# Patient Record
Sex: FEMALE
[De-identification: names, ages, dates, MRNs, and addresses within clinical notes are randomized per-mention and may not be internally consistent; named-entity substitution may affect disease eponyms.]

---

## 2018-07-30 ENCOUNTER — HEALTH MAINTENANCE LETTER (OUTPATIENT)
Age: 53
End: 2018-07-30

## 2018-08-01 NOTE — TELEPHONE ENCOUNTER
FUTURE VISIT INFORMATION      FUTURE VISIT INFORMATION:    Date: 8/13/18    Time: 1330    Location: ORTHO  REFERRAL INFORMATION:    Referring provider:  GAB LUU    Referring providers clinic:  [facility]    Reason for visit/diagnosis  R HIP    RECORDS REQUESTED FROM:       Clinic name Comments Records Status Imaging Status   [facility] FAXED REQUEST FOR IMAGES AND RECORDS 8/1/18@1200                                     RECORDS STATUS      RECORDS RECEIVED FROM: [facility]   DATE RECEIVED: 8/6/18   NOTES STATUS DETAILS   OFFICE NOTE from referring provider Received 1/2/18*2/14/18*6/20/18*7/10/18*   OFFICE NOTE from other specialist N/A    DISCHARGE SUMMARY from hospital N/A    DISCHARGE REPORT from the ER N/A    OPERATIVE REPORT N/A    MEDICATION LIST N/A    IMPLANT RECORD/STICKER N/A    LABS     CBC/DIFF N/A    CULTURES N/A    INJECTIONS DONE IN RADIOLOGY Received 6/25/18*   MRI N/A    CT SCAN Received 3/15/18*   XRAYS (IMAGES & REPORTS) N/A    TUMOR     PATHOLOGY  Slides & report N/A      RECORDS RECEIVED FROM: [place]   DATE RECEIVED: 8/6/18   NOTES STATUS DETAILS   OFFICE NOTE from referring provider N/A    OFFICE NOTE from other specialist Care Everywhere 5/17/18*   DISCHARGE SUMMARY from hospital N/A    DISCHARGE REPORT from the ER N/A    OPERATIVE REPORT N/A    MEDICATION LIST Care Everywhere    IMPLANT RECORD/STICKER N/A    LABS     CBC/DIFF Care Everywhere    DEXA Received  5/17/18*7/25/17*   NM BONE SCAN Received  5/17/18*   MRI received  6/7/18*   CT SCAN N/A    XRAYS (IMAGES & REPORTS) N/A    TUMOR     PATHOLOGY  Slides & report N/A

## 2018-08-06 ASSESSMENT — ENCOUNTER SYMPTOMS
BLOATING: 1
BLOOD IN STOOL: 0
NAUSEA: 1
STIFFNESS: 1
RECTAL PAIN: 0
ARTHRALGIAS: 1
CONSTIPATION: 0
JOINT SWELLING: 1
HEARTBURN: 1
BOWEL INCONTINENCE: 0
VOMITING: 0
ABDOMINAL PAIN: 1
MUSCLE CRAMPS: 0
NECK PAIN: 0
JAUNDICE: 0
MUSCLE WEAKNESS: 0
BACK PAIN: 1
DIARRHEA: 1
MYALGIAS: 1

## 2018-08-13 ENCOUNTER — RADIANT APPOINTMENT (OUTPATIENT)
Dept: CT IMAGING | Facility: CLINIC | Age: 53
End: 2018-08-13
Attending: ORTHOPAEDIC SURGERY
Payer: COMMERCIAL

## 2018-08-13 ENCOUNTER — OFFICE VISIT (OUTPATIENT)
Dept: ORTHOPEDICS | Facility: CLINIC | Age: 53
End: 2018-08-13
Payer: COMMERCIAL

## 2018-08-13 ENCOUNTER — PRE VISIT (OUTPATIENT)
Dept: ORTHOPEDICS | Facility: CLINIC | Age: 53
End: 2018-08-13

## 2018-08-13 DIAGNOSIS — Z96.649 PAIN DUE TO HIP JOINT PROSTHESIS (H): ICD-10-CM

## 2018-08-13 DIAGNOSIS — T84.84XA PAIN DUE TO HIP JOINT PROSTHESIS, INITIAL ENCOUNTER (H): Primary | ICD-10-CM

## 2018-08-13 DIAGNOSIS — Z96.649 PAIN DUE TO HIP JOINT PROSTHESIS, INITIAL ENCOUNTER (H): Primary | ICD-10-CM

## 2018-08-13 DIAGNOSIS — T84.84XA PAIN DUE TO HIP JOINT PROSTHESIS (H): ICD-10-CM

## 2018-08-13 PROBLEM — G43.109 MIGRAINE AURA WITHOUT HEADACHE (MIGRAINE EQUIVALENTS): Status: ACTIVE | Noted: 2018-08-13

## 2018-08-13 PROBLEM — R94.2 ABNORMAL PULMONARY FUNCTION TEST: Status: ACTIVE | Noted: 2017-12-06

## 2018-08-13 PROBLEM — E08.9 DIABETES MELLITUS SECONDARY TO PANCREATIC INSUFFICIENCY (H): Status: ACTIVE | Noted: 2018-08-13

## 2018-08-13 PROBLEM — H52.10 MYOPIA: Status: ACTIVE | Noted: 2018-08-13

## 2018-08-13 PROBLEM — R12 HEARTBURN: Status: ACTIVE | Noted: 2018-07-20

## 2018-08-13 PROBLEM — R19.7 DIARRHEA: Status: ACTIVE | Noted: 2018-07-20

## 2018-08-13 PROBLEM — I10 HYPERTENSION: Status: ACTIVE | Noted: 2018-08-13

## 2018-08-13 PROBLEM — H52.6 DISORDER OF REFRACTION AND ACCOMMODATION: Status: ACTIVE | Noted: 2018-08-13

## 2018-08-13 PROBLEM — T41.45XA ADVERSE ANESTHESIA OUTCOME: Status: ACTIVE | Noted: 2017-12-11

## 2018-08-13 PROBLEM — S73.004A DISLOCATION OF RIGHT HIP (H): Status: ACTIVE | Noted: 2018-08-13

## 2018-08-13 PROBLEM — C64.9 RENAL CELL CARCINOMA (H): Status: ACTIVE | Noted: 2018-08-13

## 2018-08-13 PROBLEM — M16.10 ARTHRITIS OF HIP: Status: ACTIVE | Noted: 2018-08-13

## 2018-08-13 PROBLEM — R10.13 EPIGASTRIC PAIN: Status: ACTIVE | Noted: 2018-07-20

## 2018-08-13 PROBLEM — K86.1 PANCREATITIS, CHRONIC (H): Status: ACTIVE | Noted: 2018-08-13

## 2018-08-13 PROBLEM — N20.0 RENAL CALCULI: Status: ACTIVE | Noted: 2018-08-13

## 2018-08-13 PROBLEM — L67.8 ABNORMAL FACIAL HAIR: Status: ACTIVE | Noted: 2018-08-13

## 2018-08-13 PROBLEM — D24.9 BENIGN NEOPLASM OF BREAST: Status: ACTIVE | Noted: 2018-05-10

## 2018-08-13 PROBLEM — M54.9 BACKACHE: Status: ACTIVE | Noted: 2018-08-13

## 2018-08-13 PROBLEM — R19.5 STOOL GUAIAC POSITIVE: Status: ACTIVE | Noted: 2017-12-08

## 2018-08-13 PROBLEM — H53.149 VISUAL DISCOMFORT: Status: ACTIVE | Noted: 2018-08-13

## 2018-08-13 PROBLEM — N64.52 DISCHARGE FROM NIPPLE: Status: ACTIVE | Noted: 2018-05-18

## 2018-08-13 PROBLEM — E55.9 VITAMIN D DEFICIENCY: Status: ACTIVE | Noted: 2018-08-13

## 2018-08-13 PROBLEM — R14.0 ABDOMINAL BLOATING: Status: ACTIVE | Noted: 2018-07-20

## 2018-08-13 PROBLEM — R91.1 PULMONARY NODULE: Status: ACTIVE | Noted: 2018-06-22

## 2018-08-13 PROBLEM — G40.909 EPILEPSY (H): Status: ACTIVE | Noted: 2018-08-13

## 2018-08-13 PROBLEM — G40.909 SEIZURE DISORDER (H): Status: ACTIVE | Noted: 2018-08-13

## 2018-08-13 PROBLEM — K86.89 DIABETES MELLITUS SECONDARY TO PANCREATIC INSUFFICIENCY (H): Status: ACTIVE | Noted: 2018-08-13

## 2018-08-13 PROBLEM — H53.2 DIPLOPIA: Status: ACTIVE | Noted: 2018-08-13

## 2018-08-13 PROBLEM — H53.16 PSYCHOPHYSICAL VISUAL DISTURBANCES: Status: ACTIVE | Noted: 2018-08-13

## 2018-08-13 LAB
ALP SERPL-CCNC: 99 U/L (ref 40–150)
BUN SERPL-MCNC: 13 MG/DL (ref 7–30)
CALCIUM SERPL-MCNC: 9.4 MG/DL (ref 8.5–10.1)
CREAT SERPL-MCNC: 0.68 MG/DL (ref 0.52–1.04)
GFR SERPL CREATININE-BSD FRML MDRD: >90 ML/MIN/1.7M2
PHOSPHATE SERPL-MCNC: 3.4 MG/DL (ref 2.5–4.5)
PTH-INTACT SERPL-MCNC: 39 PG/ML (ref 18–80)

## 2018-08-13 RX ORDER — VALACYCLOVIR HYDROCHLORIDE 1 G/1
TABLET, FILM COATED ORAL
COMMUNITY

## 2018-08-13 RX ORDER — HYDROCHLOROTHIAZIDE 25 MG/1
TABLET ORAL
COMMUNITY
Start: 2011-11-11

## 2018-08-13 RX ORDER — CARBAMAZEPINE 200 MG/1
TABLET ORAL
COMMUNITY
Start: 2000-01-02

## 2018-08-13 RX ORDER — BUDESONIDE AND FORMOTEROL FUMARATE DIHYDRATE 80; 4.5 UG/1; UG/1
AEROSOL RESPIRATORY (INHALATION)
COMMUNITY

## 2018-08-13 RX ORDER — METHOTREXATE 25 MG/ML
INJECTION, SOLUTION INTRA-ARTERIAL; INTRAMUSCULAR; INTRATHECAL; INTRAVENOUS
COMMUNITY

## 2018-08-13 RX ORDER — FLUCONAZOLE 100 MG/1
TABLET ORAL
COMMUNITY
Start: 2017-08-08

## 2018-08-13 RX ORDER — CARBAMAZEPINE 300 MG/1
CAPSULE, EXTENDED RELEASE ORAL
COMMUNITY
Start: 2000-01-02

## 2018-08-13 RX ORDER — ONDANSETRON 4 MG/1
TABLET, ORALLY DISINTEGRATING ORAL
COMMUNITY
Start: 2015-08-01

## 2018-08-13 RX ORDER — FOLIC ACID 1 MG/1
TABLET ORAL
COMMUNITY
Start: 2015-08-01

## 2018-08-13 RX ORDER — LISINOPRIL 10 MG/1
TABLET ORAL
COMMUNITY

## 2018-08-13 ASSESSMENT — HOOS S4: HOW SEVERE IS YOUR HIP JOINT STIFFNESS AFTER FIRST WAKENING IN THE MORNING?: MILD

## 2018-08-13 ASSESSMENT — ACTIVITIES OF DAILY LIVING (ADL)
ADL_SUM: 38
ADL_MEAN: 2.23
ADL_SUBSCALE_SCORE: 44.11

## 2018-08-13 NOTE — LETTER
8/13/2018       RE: Karime Bonner  [address] [facility] [address]     Dear Colleague,    Thank you for referring your patient, Karime Bonner, to the [facility]. Please see a copy of my visit note below.        [facility]  Orthopaedic Surgery, Joint Replacement Consultation  by Taz Del Rosario M.D.    Karime Bonner MRN# [id number]   Age: 52 year old YOB: 1965     Requesting physician: Keyshawn Valencia, Orthopaedist [facility], [facility]  Carolina Dailey, Rheumatology [place]  No primary care provider on file.            Assessment and Plan:   Assessment:  Given the acute onset of pain immediately after her fall and her exam demonstrating painful hip motion, I suspect the etiology of her sx's is trauma related, ie either stem loosening not evidence radiographically with minimal bone scan findings, or an occult fracture not visualized due to presence of the stem.  Proximal body dissociation of the Link implant locking mechanism is a known issue as well but would not result in such severe pain of acute onset and the patient would have sx's of instability.    Plan:  1. Obtain CT scan of pelvis and right femur to look for occult fracture vs evidence of early loosening  2. Screening labs for metabolic bone disease.   3. Obtain prior x-rays from June 2017 at [facility] for comparison.   4. Consider diagnostic lidocaine injection into R hip as needed.    Addendum 16:47:  I reviewed the CT scan today of her right femur.  There does appear to be an occult fracture line within the proximal femur at the level of the junction between the modular proximal body and stem.  In addition, lucencies are visualized in the proximal portion of the femur while the line stem appears to be well fixed and not loose.  This suggests that there may be stress reaction with micromotion of the proximal femur around the proximal body  portion of the implant causing increased stress where there is no motion of the femur around the well fixed stem with the resultant stress concentration in the region of the modular junction.  Her traumatic incident may have resulted in a completed fracture in this region.  Inadequate healing response maybe secondary to her immunosuppressed state and also account for lack of bone scan findings.    Nonsurgical management would be appropriate.  We will await results of her metabolic bone screening studies.  If nonsurgical management is not successful and her fracture line persists, one could consider revision to a larger diameter proximal body to minimize the motion of the bone around the proximal body implant.    CT 8/13/2018:  Occult fracture line and junction of modular body and stem.                History of Present Illness:   52 year old female presents for 2nd opinion with hx of right hip pain, described as a constant ache. Unable to bear weight on the right hip since fall on 12/06/2017, during which she flipped over a scooter and landed on her right hip. She had no right hip pain prior to this. She later had a second fall during which the scooter landed on top of her hip. She tried physical therapy and cortisone injections without relief. She additionally received a lumbar epidural steroid injection in 06/2018 with no benefit. She denies any back pain or left hip pain currently or following her fall. Of note, the patient has history of bilateral achilles tears status post left tendon transfer in 05/2018.     Following her falls, she has had two bone scans and an MRI of her right hip and lumbar spine. No etiology of her pain has been determined, and she was referred to our clinic by Dr. Valencia for further evaluation.   Dr. Giles checked ESR, CRP and Co and Chr ion levels, all of which were normal.  Bone scan didn't demonstrate and focal areas of uptake.    She has a known history of ankylosing spondylitis,  [id number] positive and has been immunosuppressed with Humira and methotrexate.      Current symptoms:  Problem: Right hip pain  Onset and duration: 12/06/2017, progressive since onset   Precipitating Injury:  Yes, fall   Other joints or sites painful:  No  Fever: No     Background history:  DX:   1. DDH, right   2. Renal Cell CA  3. Ankylosing spondylitis  4. Type 1 DM    TREATMENTS:  1. 1968, innominate osteotomy left, Dr. Wise   2. 11/23/1988, right ARISTIDES, Dr. Jaylan Craft, [facility]  3. 04/18/2007, revision right ARISTIDES cup with allograft bone, Dr. Craft, [facility]  4. 07/20/2007, revision right femoral head to longer neck for anterior impingement, Dr. Craft, [facility]  5. 03/12/2008, right hip iliopsoas release with incision of scar anterior hip, Dr. Craft, [facility]  6. 01/12/2009, excision of heterotopic ossification right hip and revision of femoral head, Dr. Craft, [facility]  7. 03/19/2010, revision right hip cup components Kamryn trilogy 58 mm tantalum cup, Dr. Giles, [facility]   8. 05/27/2010, partial right nephrectomy, Dr. Prasad, [facility]   9. 11/20/2012, revision right ARISTIDES stem, link size 18 x 180 mm, proximal body XXL 35 x 126, femoral head 36 x 50 mm with trochanteric osteotomy, Dr. Giles, [facility]          Physical Exam:     EXAMINATION pertinent findings:   VITAL SIGNS: There were no vitals taken for this visit.  There is no height or weight on file to calculate BMI.  RESP: non labored breathing   ABD: benign   SKIN: grossly normal   LYMPHATIC: grossly normal   NEURO: grossly normal   VASCULAR: satisfactory perfusion of all extremities   MUSCULOSKELETAL:   Full arc of right hip range of motion but painful with any motion. No focal tenderness noted  Bilateral walking boots          Data:   All laboratory data reviewed  All imaging studies reviewed by me    The bone scan from 12/2017 and 06/2018 showed mild increased uptake in right proximal femur.      Attending MD (  Taz Del Rosario) Attestation :  This patient was seen and evaluated by me including a history, exam, and interpretation of all imaging and/or lab data.  Either a training physician (resident/fellow), who also saw the patient, or scribe has documented the clinic visit in the attached note.    MD Vinicius Schmitz Family Professor  Oncology and Adult Reconstructive Surgery  Dept Orthopaedic Surgery, [facility]  [phone] office, [phone] pager  [email or web address]      DATA for DOCUMENTATION:         Past Medical History:     Patient Active Problem List   Diagnosis     Abnormal facial hair     Abnormal pulmonary function test     Achilles tendinitis     Achilles tendinosis of both lower extremities     Adverse anesthesia outcome     Ankylosing spondylitis (H)     Arthritis of both knees     Arthritis of hip     Backache     Benign neoplasm of breast     Abdominal bloating     Chondromalacia of knee     Diabetes mellitus secondary to pancreatic insufficiency (H)     Diabetes mellitus type 1 (H)     Diarrhea     Diplopia     Discharge from nipple     Dislocation of right hip (H)     Disorder of refraction and accommodation     Encounter for long-term (current) use of high-risk medication     Epilepsy (H)     Epilepsy, unspecified, not intractable, without status epilepticus (H)     Esophoria     Gastroparesis     Heartburn     Hypertension     Insulin pump status     Malignant neoplasm of kidney (H)     Menopause present     Migraine aura without headache (migraine equivalents)     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     Muscle weakness (generalized)     Myopia     Osteopenia     Epigastric pain     Pain in limb     Pancreatitis, chronic (H)     Postoperative heterotopic calcification     Pulmonary nodule     Renal calculi     Renal cell carcinoma (H)     Rotator cuff impingement syndrome of left shoulder     S/P arthroscopy of shoulder     Seizure disorder (H)     Stool guaiac positive      Visual discomfort     Psychophysical visual disturbances     Vitamin D deficiency     Past Medical History:   Diagnosis Date     Arthritis 1973    Was not diagnosed with Ankylosing spondolitis until 2015     Cancer (H) 2010    Clear cell 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       Past Surgical History:     Past Surgical History:   Procedure Laterality Date     C PELVIS/HIP JOINT SURGERY UNLISTED      Many, many have papers on these     C SHOULDER SURG PROC UNLISTED  2012, 2014     C STOMACH SURGERY PROCEDURE UNLISTED  1980    Exploratory laparoscopic            Social History:     Social History     Social History     Marital status:      Spouse name: N/A     Number of children: N/A     Years of education: N/A     Occupational History     Not on file.     Social History Main Topics     Smoking status: Never Smoker     Smokeless tobacco: Never Used     Alcohol use No     Drug use: No     Sexual activity: Not Currently     Partners: Male     Birth control/ protection: Post-menopausal     Other Topics Concern     Not on file     Social History Narrative     No narrative on file            Family History:       Family History   Problem Relation Age of Onset     Diabetes Father      Type 2     Hypertension Father      Cerebrovascular Disease Father      Prostate Cancer Father      Thyroid Disease Father      Coronary Artery Disease Mother      Hypertension Mother      Hyperlipidemia Mother      Cerebrovascular Disease Mother      Asthma Mother      Osteoperosis Mother      Low Back Problems Mother      Spine Problems Mother      Hypertension  Brother      Hypertension Sister      Hyperlipidemia Sister             Medications:     Current Outpatient Prescriptions   Medication Sig     adalimumab (HUMIRA) 40 MG/0.8ML prefilled syringe kit      amylase-lipase-protease (CREON) 61349 units CPEP      amylase-lipase-protease (CREON) 16031-13448 units CPEP per EC capsule      budesonide-formoterol (SYMBICORT) 80-4.5 MCG/ACT Inhaler      carBAMazepine (CARBATROL) 300 MG 12 hr capsule      carBAMazepine (TEGRETOL) 200 MG tablet      fluconazole (DIFLUCAN) 100 MG tablet      folic acid (FOLVITE) 1 MG tablet      hydrochlorothiazide (HYDRODIURIL) 25 MG tablet      insulin aspart (NovoLOG) inject - to fill ambulatory pump      lisinopril (PRINIVIL/ZESTRIL) 10 MG tablet      methotrexate (RHEUMATREX) 25 MG/ML injection      omeprazole (PRILOSEC) 20 MG CR capsule      ondansetron (ZOFRAN-ODT) 4 MG ODT tab      valACYclovir (VALTREX) 1000 mg tablet      No current facility-administered medications for this visit.               Review of Systems:   A comprehensive 10 point review of systems (constitutional, ENT, cardiac, peripheral vascular, lymphatic, respiratory, GI, , Musculoskeletal, skin, Neurological) was performed and found to be negative except as described in this note.     See intake form completed by patient    Sincerely,    Taz Del Rosario MD

## 2018-08-13 NOTE — PROGRESS NOTES
[facility]  Orthopaedic Surgery, Joint Replacement Consultation  by Taz Del Rosario M.D.    Karime Bonner MRN# [id number]   Age: 52 year old YOB: 1965     Requesting physician: Keyshawn Valencia, Orthopaedist Martha Giles, [facility]  Carolina Dailey, Rheumatology [place]  No primary care provider on file.            Assessment and Plan:   Assessment:  Given the acute onset of pain immediately after her fall and her exam demonstrating painful hip motion, I suspect the etiology of her sx's is trauma related, ie either stem loosening not evidence radiographically with minimal bone scan findings, or an occult fracture not visualized due to presence of the stem.  Proximal body dissociation of the Link implant locking mechanism is a known issue as well but would not result in such severe pain of acute onset and the patient would have sx's of instability.    Plan:  1. Obtain CT scan of pelvis and right femur to look for occult fracture vs evidence of early loosening  2. Screening labs for metabolic bone disease.   3. Obtain prior x-rays from June 2017 at [place] for comparison.   4. Consider diagnostic lidocaine injection into R hip as needed.    Addendum 16:47:  I reviewed the CT scan today of her right femur.  There does appear to be an occult fracture line within the proximal femur at the level of the junction between the modular proximal body and stem.  In addition, lucencies are visualized in the proximal portion of the femur while the line stem appears to be well fixed and not loose.  This suggests that there may be stress reaction with micromotion of the proximal femur around the proximal body portion of the implant causing increased stress where there is no motion of the femur around the well fixed stem with the resultant stress concentration in the region of the modular junction.  Her traumatic incident may have resulted in a completed fracture in this region.   Inadequate healing response maybe secondary to her immunosuppressed state and also account for lack of bone scan findings.    Nonsurgical management would be appropriate.  We will await results of her metabolic bone screening studies.  If nonsurgical management is not successful and her fracture line persists, one could consider revision to a larger diameter proximal body to minimize the motion of the bone around the proximal body implant.    CT 8/13/2018:  Occult fracture line and junction of modular body and stem.                History of Present Illness:   52 year old female presents for 2nd opinion with hx of right hip pain, described as a constant ache. Unable to bear weight on the right hip since fall on 12/06/2017, during which she flipped over a scooter and landed on her right hip. She had no right hip pain prior to this. She later had a second fall during which the scooter landed on top of her hip. She tried physical therapy and cortisone injections without relief. She additionally received a lumbar epidural steroid injection in 06/2018 with no benefit. She denies any back pain or left hip pain currently or following her fall. Of note, the patient has history of bilateral achilles tears status post left tendon transfer in 05/2018.     Following her falls, she has had two bone scans and an MRI of her right hip and lumbar spine. No etiology of her pain has been determined, and she was referred to our clinic by Dr. Valencia for further evaluation.   Dr. Giles checked ESR, CRP and Co and Chr ion levels, all of which were normal.  Bone scan didn't demonstrate and focal areas of uptake.    She has a known history of ankylosing spondylitis, HLA-B27 positive and has been immunosuppressed with Humira and methotrexate.      Current symptoms:  Problem: Right hip pain  Onset and duration: 12/06/2017, progressive since onset   Precipitating Injury:  Yes, fall   Other joints or sites painful:  No  Fever: No     Background  history:  DX:   1. DDH, right   2. Renal Cell CA  3. Ankylosing spondylitis  4. Type 1 DM    TREATMENTS:  1. 1968, innominate osteotomy left, Dr. Wise   2. 11/23/1988, right ARISTIDES, Dr. Jaylan Craft, [facility]  3. 04/18/2007, revision right ARISTIDES cup with allograft bone, Dr. Craft, [facility]  4. 07/20/2007, revision right femoral head to longer neck for anterior impingement, Dr. Craft, [facility]  5. 03/12/2008, right hip iliopsoas release with incision of scar anterior hip, Dr. Craft, [facility]  6. 01/12/2009, excision of heterotopic ossification right hip and revision of femoral head, Dr. Craft, [facility]  7. 03/19/2010, revision right hip cup components Kamryn trilogy 58 mm tantalum cup, Dr. Giles, [facility]   8. 05/27/2010, partial right nephrectomy, Dr. Prasad, [facility]   9. 11/20/2012, revision right ARISTIDES stem, link size 18 x 180 mm, proximal body XXL 35 x 126, femoral head 36 x 50 mm with trochanteric osteotomy, Dr. Giles, [facility]          Physical Exam:     EXAMINATION pertinent findings:   VITAL SIGNS: There were no vitals taken for this visit.  There is no height or weight on file to calculate BMI.  RESP: non labored breathing   ABD: benign   SKIN: grossly normal   LYMPHATIC: grossly normal   NEURO: grossly normal   VASCULAR: satisfactory perfusion of all extremities   MUSCULOSKELETAL:   Full arc of right hip range of motion but painful with any motion. No focal tenderness noted  Bilateral walking boots          Data:   All laboratory data reviewed  All imaging studies reviewed by me    The bone scan from 12/2017 and 06/2018 showed mild increased uptake in right proximal femur.      Attending MD (Dr. Taz Del Rosario) Attestation :  This patient was seen and evaluated by me including a history, exam, and interpretation of all imaging and/or lab data.  Either a training physician (resident/fellow), who also saw the patient, or scribe has documented the clinic visit in the attached  note.    MD Vinicius Schmitz Family Professor  Oncology and Adult Reconstructive Surgery  Dept Orthopaedic Surgery, [facility]  [phone] office, [phone] pager  [email or web address]    Scribe Disclosure:   I, Lin Agudelo, am serving as a scribe to document services personally performed by Taz Del Rosario MD at this visit, based upon the provider's statements to me. All documentation has been reviewed by the aforementioned provider prior to being entered into the official medical record.     Portions of this medical record were completed by a scribe. UPON MY REVIEW AND AUTHENTICATION BY ELECTRONIC SIGNATURE, this confirms (a) I performed the applicable clinical services, and (b) the record is accurate.      DATA for DOCUMENTATION:         Past Medical History:     Patient Active Problem List   Diagnosis     Abnormal facial hair     Abnormal pulmonary function test     Achilles tendinitis     Achilles tendinosis of both lower extremities     Adverse anesthesia outcome     Ankylosing spondylitis (H)     Arthritis of both knees     Arthritis of hip     Backache     Benign neoplasm of breast     Abdominal bloating     Chondromalacia of knee     Diabetes mellitus secondary to pancreatic insufficiency (H)     Diabetes mellitus type 1 (H)     Diarrhea     Diplopia     Discharge from nipple     Dislocation of right hip (H)     Disorder of refraction and accommodation     Encounter for long-term (current) use of high-risk medication     Epilepsy (H)     Epilepsy, unspecified, not intractable, without status epilepticus (H)     Esophoria     Gastroparesis     Heartburn     Hypertension     Insulin pump status     Malignant neoplasm of kidney (H)     Menopause present     Migraine aura without headache (migraine equivalents)     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     Muscle weakness (generalized)     Myopia     Osteopenia     Epigastric pain     Pain in limb     Pancreatitis, chronic  (H)     Postoperative heterotopic calcification     Pulmonary nodule     Renal calculi     Renal cell carcinoma (H)     Rotator cuff impingement syndrome of left shoulder     S/P arthroscopy of shoulder     Seizure disorder (H)     Stool guaiac positive     Visual discomfort     Psychophysical visual disturbances     Vitamin D deficiency     Past Medical History:   Diagnosis Date     Arthritis 1973    Was not diagnosed with Ankylosing spondolitis until 2015     Cancer (H) 2010    Clear cell 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       Past Surgical History:     Past Surgical History:   Procedure Laterality Date     C PELVIS/HIP JOINT SURGERY UNLISTED      Many, many have papers on these     C SHOULDER SURG PROC UNLISTED  2012, 2014     C STOMACH SURGERY PROCEDURE UNLISTED  1980    Exploratory laparoscopic            Social History:     Social History     Social History     Marital status:      Spouse name: N/A     Number of children: N/A     Years of education: N/A     Occupational History     Not on file.     Social History Main Topics     Smoking status: Never Smoker     Smokeless tobacco: Never Used     Alcohol use No     Drug use: No     Sexual activity: Not Currently     Partners: Male     Birth control/ protection: Post-menopausal     Other Topics Concern     Not on file     Social History Narrative     No narrative on file            Family History:       Family History   Problem Relation Age of Onset     Diabetes Father      Type 2     Hypertension Father      Cerebrovascular Disease Father      Prostate Cancer Father      Thyroid  Disease Father      Coronary Artery Disease Mother      Hypertension Mother      Hyperlipidemia Mother      Cerebrovascular Disease Mother      Asthma Mother      Osteoperosis Mother      Low Back Problems Mother      Spine Problems Mother      Hypertension Brother      Hypertension Sister      Hyperlipidemia Sister             Medications:     Current Outpatient Prescriptions   Medication Sig     adalimumab (HUMIRA) 40 MG/0.8ML prefilled syringe kit      amylase-lipase-protease (CREON) 35230 units CPEP      amylase-lipase-protease (CREON) 69444-92538 units CPEP per EC capsule      budesonide-formoterol (SYMBICORT) 80-4.5 MCG/ACT Inhaler      carBAMazepine (CARBATROL) 300 MG 12 hr capsule      carBAMazepine (TEGRETOL) 200 MG tablet      fluconazole (DIFLUCAN) 100 MG tablet      folic acid (FOLVITE) 1 MG tablet      hydrochlorothiazide (HYDRODIURIL) 25 MG tablet      insulin aspart (NovoLOG) inject - to fill ambulatory pump      lisinopril (PRINIVIL/ZESTRIL) 10 MG tablet      methotrexate (RHEUMATREX) 25 MG/ML injection      omeprazole (PRILOSEC) 20 MG CR capsule      ondansetron (ZOFRAN-ODT) 4 MG ODT tab      valACYclovir (VALTREX) 1000 mg tablet      No current facility-administered medications for this visit.               Review of Systems:   A comprehensive 10 point review of systems (constitutional, ENT, cardiac, peripheral vascular, lymphatic, respiratory, GI, , Musculoskeletal, skin, Neurological) was performed and found to be negative except as described in this note.     See intake form completed by patient

## 2018-08-13 NOTE — MR AVS SNAPSHOT
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 Also bring a list of your medicines, including vitamins, minerals and over-the-counter drugs. It is safest to leave personal items at home.  Be sure to tell your doctor:   If you have any allergies.   If there s any chance you are pregnant.   If you are breastfeeding.  You do not need to do anything special to prepare for this exam.  Please wear loose clothing, such as a sweat suit or jogging clothes. Avoid snaps, zippers and other metal. We may ask you to undress and put on a hospital gown.              Who to contact     Please call your clinic at [phone] to:    Ask questions about your health    Make or cancel appointments    Discuss your medicines    Learn about your test results    Speak to your doctor            Additional Information About Your Visit        [employer]Usagehart Information     [employer] gives you secure access to your electronic health record. If you see a primary care provider, you can also send messages to your care team and make appointments. If you have questions, please call your primary care clinic.  If you do not have a primary care provider, please call [phone] and they will assist you.      [employer] is an electronic gateway that provides easy,  online access to your medical records. With Drop Messages, you can request a clinic appointment, read your test results, renew a prescription or communicate with your care team.     To access your existing account, please contact your [facility] or call [phone] for assistance.        Care EveryWhere ID     This is your Care EveryWhere ID. This could be used by other organizations to access your [place] medical records  [id number]         Blood Pressure from Last 3 Encounters:   No data found for BP    Weight from Last 3 Encounters:   No data found for Wt               Primary Care Provider    None Specified       No primary provider on file.        Equal Access to Services     [facility]: Hadii narciso perla haddallas Soolena, waaxda ellaadaha, qahilaria carreonalmadesirae garzon, dontrell ignacio . So [facility] [phone].    ATENCIÓN: Si habla español, tiene a sanchez disposición servicios gratuitos de asistencia lingüística. [facility] [phone].    We comply with applicable federal civil rights laws and [place] laws. We do not discriminate on the basis of race, color, national origin, age, disability, sex, sexual orientation, or gender identity.            Thank you!     Thank you for choosing [facility]  for your care. Our goal is always to provide you with excellent care. Hearing back from our patients is one way we can continue to improve our services. Please take a few minutes to complete the written survey that you may receive in the mail after your visit with us. Thank you!             Your Updated Medication List - Protect others around you: Learn how to safely use, store and throw away your medicines at www.disposemymeds.org.          This list is accurate as of 8/13/18  5:06 PM.  Always use your most recent med list.                   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, initial encounter (H)       fluconazole 100 MG tablet    DIFLUCAN      Pain due to hip joint prosthesis, initial encounter (H)       folic acid 1 MG tablet    FOLVITE      Pain due to hip joint prosthesis, initial encounter (H)       HUMIRA 40 MG/0.8ML prefilled syringe kit   Generic drug:  adalimumab       Pain due to hip joint prosthesis, initial encounter (H)       hydrochlorothiazide 25 MG tablet    HYDRODIURIL      Pain due to hip joint prosthesis, initial encounter (H)       insulin aspart 100 UNIT/ML injection    NovoLOG/FIASP      Pain due to hip joint prosthesis, initial encounter (H)       lisinopril 10 MG tablet    PRINIVIL/ZESTRIL      Pain due to hip joint prosthesis, initial encounter (H)       methotrexate 25 MG/ML injection    RHEUMATREX      Pain due to hip joint prosthesis, initial encounter (H)       omeprazole 20 MG CR capsule    priLOSEC      Pain due to hip joint prosthesis, initial encounter (H)       ondansetron 4 MG ODT tab    ZOFRAN-ODT      Pain due to hip joint prosthesis, initial encounter (H)       SYMBICORT 80-4.5 MCG/ACT Inhaler   Generic drug:  budesonide-formoterol       Pain due to hip joint prosthesis, initial encounter (H)       valACYclovir 1000 mg tablet    VALTREX      Pain due to hip joint prosthesis, initial encounter (H)       * Notice:  This list has 4 medication(s) that are the same as other medications prescribed for you. Read the directions carefully, and ask your doctor or other care provider to review them with you.

## 2018-08-14 LAB — DEPRECATED CALCIDIOL+CALCIFEROL SERPL-MC: 37 UG/L (ref 20–75)

## 2018-08-30 NOTE — PROGRESS NOTES
Karime,  I reviewed the CT scan as well as laboratory studies performed looking for metabolic bone disease.  There is no evidence of vitamin D deficiency or any other metabolic bone disease present.  The CT scan does not demonstrate any evidence of occult fracture about your prostheses either.  Therefore it is difficult to know the exact cause of your hip pain.  The fact that the pain began after a traumatic fall would be highly suggestive of a bone injury.  The bottom line is that I would not advise any surgical intervention at this point in time.  I suspect your symptoms will either resolve completely or if a more serious problem other than a stress fracture is present, then the pain will become progressively worse and declare itself with abnormal findings on subsequent repeat imaging studies.  I did suggest waiting at least one year before repeating any imaging studies.  In the meanwhile, treating her symptoms symptomatically by avoiding activities which aggravate her symptoms and be  mindful of the adequate nutritional diet with calcium and vitamin D sedimentation would be appropriate.    Taz Del Rosario MD  8/30/2018  5:22 PM

## 2018-09-24 NOTE — PROGRESS NOTES
The results are either normal or are clinically acceptable.    Taz Del Rosario MD  9/24/2018  2:16 PM

## 2019-11-07 ENCOUNTER — HEALTH MAINTENANCE LETTER (OUTPATIENT)
Age: 54
End: 2019-11-07

## 2020-02-23 ENCOUNTER — HEALTH MAINTENANCE LETTER (OUTPATIENT)
Age: 55
End: 2020-02-23

## 2020-11-29 ENCOUNTER — HEALTH MAINTENANCE LETTER (OUTPATIENT)
Age: 55
End: 2020-11-29

## 2021-06-05 ENCOUNTER — HEALTH MAINTENANCE LETTER (OUTPATIENT)
Age: 56
End: 2021-06-05

## 2021-09-25 ENCOUNTER — HEALTH MAINTENANCE LETTER (OUTPATIENT)
Age: 56
End: 2021-09-25

## 2021-11-20 ENCOUNTER — HEALTH MAINTENANCE LETTER (OUTPATIENT)
Age: 56
End: 2021-11-20

## 2022-01-15 ENCOUNTER — HEALTH MAINTENANCE LETTER (OUTPATIENT)
Age: 57
End: 2022-01-15

## 2022-08-27 ENCOUNTER — HEALTH MAINTENANCE LETTER (OUTPATIENT)
Age: 57
End: 2022-08-27

## 2022-12-26 ENCOUNTER — HEALTH MAINTENANCE LETTER (OUTPATIENT)
Age: 57
End: 2022-12-26

## 2023-04-22 ENCOUNTER — HEALTH MAINTENANCE LETTER (OUTPATIENT)
Age: 58
End: 2023-04-22

## 2023-11-26 ENCOUNTER — HEALTH MAINTENANCE LETTER (OUTPATIENT)
Age: 58
End: 2023-11-26